# Patient Record
Sex: MALE | HISPANIC OR LATINO | URBAN - METROPOLITAN AREA
[De-identification: names, ages, dates, MRNs, and addresses within clinical notes are randomized per-mention and may not be internally consistent; named-entity substitution may affect disease eponyms.]

---

## 2024-04-26 ENCOUNTER — OFFICE VISIT (OUTPATIENT)
Dept: SLEEP MEDICINE | Facility: HOSPITAL | Age: 49
End: 2024-04-26
Payer: MEDICAID

## 2024-04-26 VITALS
DIASTOLIC BLOOD PRESSURE: 85 MMHG | HEART RATE: 79 BPM | BODY MASS INDEX: 39.17 KG/M2 | HEIGHT: 75 IN | OXYGEN SATURATION: 95 % | SYSTOLIC BLOOD PRESSURE: 141 MMHG | WEIGHT: 315 LBS

## 2024-04-26 DIAGNOSIS — G47.10 HYPERSOMNIA: Primary | ICD-10-CM

## 2024-04-26 DIAGNOSIS — R06.81 WITNESSED EPISODE OF APNEA: ICD-10-CM

## 2024-04-26 DIAGNOSIS — E66.01 MORBID OBESITY WITH BODY MASS INDEX OF 40.0-49.9: ICD-10-CM

## 2024-04-26 DIAGNOSIS — I50.22 CHRONIC SYSTOLIC CONGESTIVE HEART FAILURE: ICD-10-CM

## 2024-04-26 PROCEDURE — G0463 HOSPITAL OUTPT CLINIC VISIT: HCPCS

## 2024-04-26 NOTE — PROGRESS NOTES
"[facility]  Telephone: [phone] / Fax: [phone] [place]  Telephone: [phone] / Fax: [phone] Shu Stover    Referring Physician: Dr Peters  PCP: Unknown.    Reason for consult:  sleep apnea- visit through Steven, interpretor ID #[id number].    Keith Lucas is a 49 y.o.male  was seen in the [facility] today per request of cardiologist for witnessed apneas, and sleep disturbances. He gained 80 lbs in past 5 years. He wakes up choking/gasping for breath and has to sit up to breathe. He wakes up feeling tired. He has CHF, cardiomyopathy, HTN, and difficulty controlling BP.  He reports recurrent arousals from sleep due to sleep disturbances/apneas. Sleep schedule is 10pm-3am.    SH-  current smoker since age 18, 1/2ppd.    ROS + leg edema, apneas.        Keith Lucas  has no past medical history on file.    Current Medications:  Tylenol 325mg  Aspirin  Atorvastatin 40mg  Coreg 12.5mg   Farxiga 5mg daily  Iron daily  Furosemide 40  Imdur 20mg  Losartan 50mg  Omeprazole 20  KCL    I have reviewed Past Medical History, Past Surgical History, Medication List, Social History and Family History as entered in Sleep Questionnaire and EPIC.    ESS  Not completed   Vital Signs /85   Pulse 79   Ht 190.5 cm (75\")   Wt (!) 154 kg (339 lb)   SpO2 95%   BMI 42.37 kg/m²  Body mass index is 42.37 kg/m².    General Alert and oriented. No acute distress noted   Pharynx/Throat Class IV  Mallampati airway, large tongue, no evidence of redundant lateral pharyngeal tissue. No oral lesions. No thrush. Moist mucous membranes.   Head Normocephalic. Symmetrical. Atraumatic.    Nose No septal deviation. No drainage   Chest Wall Normal shape. Symmetric expansion with respiration. No tenderness.   Neck Trachea midline, no thyromegaly or adenopathy    Lungs Clear to auscultation bilaterally. No wheezes. No rhonchi. No rales. Respirations regular, even " and unlabored.   Heart Regular rhythm and normal rate. Normal S1 and S2. No murmur   Abdomen Soft, non-tender and non-distended. Normal bowel sounds. No masses.   Extremities Moves all extremities well. +moderate edema   Psychiatric Normal mood and affect.        Impression:  1. Hypersomnia    2. Witnessed episode of apnea    3. Chronic systolic congestive heart failure    4. Morbid obesity with body mass index of 40.0-49.9          Plan:  I discussed the pathophysiology of obstructive sleep apnea with the patient.  We discussed the adverse outcomes associated with untreated sleep-disordered breathing.      Sleep study in the lab to assess for PRITI and other sleep disturbances was ordered.     Weight loss will be strongly beneficial in order to reduce the severity of sleep-disordered breathing.  Patient has narrow oropharyngeal structure.  Caution during activities that require prolonged concentration is strongly advised.      After sleep study results are available, patient will be notified, and appointment will be scheduled to discuss sleep study results and treatment recommendations.      I appreciate the opportunity to participate in this patient's care.      BROOKE Wong  [facility]  Phone: [phone]      Part of this note may be an electronic transcription/translation of spoken language to printed text using the Dragon Dictation System. Some errors may exist even though the document was edited.

## 2024-05-21 ENCOUNTER — HOSPITAL ENCOUNTER (OUTPATIENT)
Dept: SLEEP MEDICINE | Facility: HOSPITAL | Age: 49
End: 2024-05-21
Payer: MEDICAID

## 2024-05-21 DIAGNOSIS — I50.22 CHRONIC SYSTOLIC CONGESTIVE HEART FAILURE: ICD-10-CM

## 2024-05-21 DIAGNOSIS — G47.10 HYPERSOMNIA: ICD-10-CM

## 2024-05-21 DIAGNOSIS — R06.81 WITNESSED EPISODE OF APNEA: ICD-10-CM

## 2024-05-21 PROCEDURE — 95811 POLYSOM 6/>YRS CPAP 4/> PARM: CPT

## 2024-05-28 DIAGNOSIS — G47.33 OBSTRUCTIVE SLEEP APNEA, ADULT: Primary | ICD-10-CM

## 2024-05-31 ENCOUNTER — TELEPHONE (OUTPATIENT)
Dept: SLEEP MEDICINE | Facility: HOSPITAL | Age: 49
End: 2024-05-31
Payer: MEDICAID